# Patient Record
Sex: FEMALE | Race: WHITE
[De-identification: names, ages, dates, MRNs, and addresses within clinical notes are randomized per-mention and may not be internally consistent; named-entity substitution may affect disease eponyms.]

---

## 2018-09-30 ENCOUNTER — HOSPITAL ENCOUNTER (EMERGENCY)
Age: 63
Discharge: HOME | End: 2018-09-30
Payer: COMMERCIAL

## 2018-09-30 VITALS
OXYGEN SATURATION: 100 % | HEART RATE: 61 BPM | TEMPERATURE: 99.3 F | SYSTOLIC BLOOD PRESSURE: 170 MMHG | DIASTOLIC BLOOD PRESSURE: 94 MMHG | RESPIRATION RATE: 16 BRPM

## 2018-09-30 VITALS — HEART RATE: 67 BPM | DIASTOLIC BLOOD PRESSURE: 87 MMHG | SYSTOLIC BLOOD PRESSURE: 139 MMHG | OXYGEN SATURATION: 96 %

## 2018-09-30 VITALS — BODY MASS INDEX: 20.8 KG/M2

## 2018-09-30 DIAGNOSIS — N39.0: Primary | ICD-10-CM

## 2018-09-30 LAB
APPEARANCE UR: (no result)
BILIRUBIN URINE UA: NEGATIVE
COLOR UR: (no result)
CULTURE INDICATED URINE: (no result)
GLUCOSE URINE UA: NEGATIVE G/DL
HGB UR QL: (no result)
KETONES URINE UA: NEGATIVE
LEUKOCYTE ESTERASE URINE UA: (no result)
PH UR: 8.5 [PH] (ref 4.5–8)
PROTEIN URINE UA: (no result)
SP GR UR: 1.01 (ref 1–1.03)
UROBILINOGEN UR QL: 0.2 E.U./DL

## 2018-09-30 PROCEDURE — 87186 SC STD MICRODIL/AGAR DIL: CPT

## 2018-09-30 PROCEDURE — 99282 EMERGENCY DEPT VISIT SF MDM: CPT

## 2018-09-30 PROCEDURE — 99283 EMERGENCY DEPT VISIT LOW MDM: CPT

## 2018-09-30 PROCEDURE — 81001 URINALYSIS AUTO W/SCOPE: CPT

## 2018-09-30 PROCEDURE — 87086 URINE CULTURE/COLONY COUNT: CPT

## 2018-09-30 PROCEDURE — 87077 CULTURE AEROBIC IDENTIFY: CPT

## 2018-12-09 ENCOUNTER — HOSPITAL ENCOUNTER (OUTPATIENT)
Age: 63
End: 2018-12-09
Payer: COMMERCIAL

## 2018-12-09 DIAGNOSIS — R30.0: Primary | ICD-10-CM

## 2018-12-09 PROCEDURE — 87086 URINE CULTURE/COLONY COUNT: CPT

## 2018-12-09 PROCEDURE — 87186 SC STD MICRODIL/AGAR DIL: CPT

## 2018-12-09 PROCEDURE — 87077 CULTURE AEROBIC IDENTIFY: CPT

## 2019-04-17 ENCOUNTER — HOSPITAL ENCOUNTER (OUTPATIENT)
Age: 64
End: 2019-04-17
Payer: COMMERCIAL

## 2019-04-17 DIAGNOSIS — J02.9: Primary | ICD-10-CM

## 2019-04-17 PROCEDURE — 87070 CULTURE OTHR SPECIMN AEROBIC: CPT

## 2019-04-17 PROCEDURE — 87077 CULTURE AEROBIC IDENTIFY: CPT

## 2022-01-20 NOTE — ED.FEMALEGU
"HPI - Female Genitourinary
<VAMSHI Butler - Last Filed: 09/30/18 17:20>
General
Chief complaint: Urogenital-Female
Stated complaint: UTI
Time Seen by Provider: 09/30/18 17:20
Source: patient
Mode of arrival: ambulatory
Limitations: no limitations
Related Data
Home Medications

 Medication  Instructions  Recorded  Confirmed
VITAMIN D #0 05/10/17 10/08/18
[TUMERIC] #0 05/10/17 10/08/18
ascorbic acid (vitamin C) #0 05/10/17 10/08/18
cyanocobalamin (vitamin B-12) #0 05/10/17 10/08/18
[Vitamin B-12]   
glucosamine sulfate [Genicin] #0 05/10/17 10/08/18

Previous Rx's

 Medication  Instructions  Recorded
sulfamethoxazole-trimethoprim 1 tab PO BID #10 tab 09/30/18


Allergies

Allergy/AdvReac Type Severity Reaction Status Date / Time
No Known Drug Allergies Allergy   Verified 10/08/18 09:58



<Lyndsay Decker DO - Last Filed: 10/17/18 00:25>
General
Source: patient
Mode of arrival: ambulatory
Limitations: no limitations
History of Present Illness
HPI Narrative: The patient is a 63-year-old female who presents with painful frequent urination with all blood.  She never had a UTI before but she is going frequently.  She woke up this morning with symptoms. No fever no flank pain she does have 
some suprapubic pressure.  She denies nausea or vomiting.
MD Complaint:  UTI 

<Lyndsay Decker DO - Last Filed: 10/17/18 00:25>
Review of Systems
GENERAL: Denies chills,fever
HEENT: Denies throat pain
RESPIRATORY: Denies dyspnea, cough, wheezing
CARDIOVASCULAR: Denies chest pain, palpitations
GASTROINTESTINAL: Denies nausea, vomiting
:  See HPI
MUSCULOSKELETAL: Denies extremity pain, injury
SKIN: No rash, no laceration, no pruritus
NEUROLOGIC: Denies weakness, dizziness, headache, numbness


8 point review of systems is negative except for those stated above and HPI

Exam
<VAMSHI Butler - Last Filed: 09/30/18 17:20>
Initial Vital Signs
Initial Vital Signs:  Vital Signs

Temperature  99.3 F   09/30/18 17:10
Pulse Rate  61   09/30/18 17:10
Respiratory Rate  16   09/30/18 17:10
Blood Pressure  170/94 H  09/30/18 17:10
Pulse Oximetry  100   09/30/18 17:10



<Lyndsay Decker DO - Last Filed: 10/17/18 00:25>
Initial Vital Signs
Initial Vital Signs:  Vital Signs

Temperature  99.3 F   09/30/18 17:10
Pulse Rate  61   09/30/18 17:10
Respiratory Rate  16   09/30/18 17:10
Blood Pressure  170/94 H  09/30/18 17:10
Pulse Oximetry  100   09/30/18 17:10

GENERAL: Well-appearing, well-nourished and in no acute distress.
CARDIOVASCULAR: peripheral pulses in tact, cap refill <2 sec, regular rate and rhythm
RESPIRATORY: No respiratory distress,  speaks in full sentences without difficulty clear bilaterally
ABDOMEN: Soft, nontender, no guarding or rebound
:  No CVA tenderness
EXTREMITIES: Normal range of motion, no clubbing or edema.  Neurovascularly intact
NEUROLOGICAL: Cranial nerves II through XII grossly intact.  Normal gait and speech.
SKIN: Warm, dry, no petechiae, no rashes or lesions.

Course
<VAMSHI Butler - Last Filed: 09/30/18 17:20>
Orders
Ordered:  


Discontinued Medications

Phenazopyridine HCl (Pyridium 100mg Prepack)  1 bottle MISC SEEINSTR ONE
 Stop: 09/30/18 17:48
 Last Admin: 09/30/18 18:01  Dose: 1 bottle
Trimethoprim/Sulfamethoxazole (Bactrim Ds Prepack)  1 bottle MISC SEEINSTR ONE
 Stop: 09/30/18 17:48
 Last Admin: 09/30/18 18:01  Dose: 1 bottle


Vital Signs - 8 hr

 09/30/18
17:10
Temperature 99.3 F
Pulse Rate 61
Respiratory Rate 16
Blood Pressure 170/94 H
Pulse Oximetry 100



<Lyndsay Decker DO - Last Filed: 10/17/18 00:25>
Orders
Ordered:  


Discontinued Medications

Phenazopyridine HCl (Pyridium 100mg Prepack)  1 bottle MISC SEEINSTR ONE
 Stop: 09/30/18 17:48
 Last Admin: 09/30/18 18:01  Dose: 1 bottle
Trimethoprim/Sulfamethoxazole (Bactrim Ds Prepack)  1 bottle MISC SEEINSTR ONE
 Stop: 09/30/18 17:48
 Last Admin: 09/30/18 18:01  Dose: 1 bottle


Vital Signs - 8 hr

 09/30/18

[id number]|2018-09-30 17:20:00|2018-09-30 17:20:00|[facility]|Emergency Department|[id number]|"HPI - Female Genitourinary
Counseling Text: I recommended taking nicotinamide or niacinamide, also know as vitamin B3, twice daily. Recent evidence suggests that taking vitamin B3 (500 mg twice daily) can reduce the risk of actinic keratoses and non-melanoma skin cancers. Side effects of vitamin B3 include flushing and headache.
Detail Level: Simple

## 2023-01-18 ENCOUNTER — APPOINTMENT (RX ONLY)
Dept: URBAN - METROPOLITAN AREA CLINIC 50 | Facility: CLINIC | Age: 68
Setting detail: DERMATOLOGY
End: 2023-01-18

## 2023-01-18 DIAGNOSIS — Z41.9 ENCOUNTER FOR PROCEDURE FOR PURPOSES OTHER THAN REMEDYING HEALTH STATE, UNSPECIFIED: ICD-10-CM

## 2023-01-18 PROCEDURE — ? RESTYLANE KYSSE INJECTION

## 2023-01-18 PROCEDURE — ? DYSPORT

## 2023-01-18 ASSESSMENT — LOCATION DETAILED DESCRIPTION DERM
LOCATION DETAILED: LEFT MEDIAL EYEBROW
LOCATION DETAILED: LEFT SUPERIOR VERMILION LIP
LOCATION DETAILED: RIGHT INFERIOR VERMILION LIP
LOCATION DETAILED: LEFT INFERIOR VERMILION LIP
LOCATION DETAILED: RIGHT CENTRAL EYEBROW
LOCATION DETAILED: GLABELLA
LOCATION DETAILED: RIGHT SUPERIOR VERMILION LIP

## 2023-01-18 ASSESSMENT — LOCATION SIMPLE DESCRIPTION DERM
LOCATION SIMPLE: LEFT LIP
LOCATION SIMPLE: GLABELLA
LOCATION SIMPLE: LEFT EYEBROW
LOCATION SIMPLE: RIGHT LIP
LOCATION SIMPLE: RIGHT EYEBROW

## 2023-01-18 ASSESSMENT — LOCATION ZONE DERM
LOCATION ZONE: LIP
LOCATION ZONE: FACE

## 2023-01-18 NOTE — PROCEDURE: RESTYLANE KYSSE INJECTION
Procedural Text: The filler was administered to the treatment areas noted above\\n\\nConsent signed. All questions answered.

## 2023-01-18 NOTE — PROCEDURE: DYSPORT
Consent: All questions answered.\\n\\[id number]; 48 Dysport units Consent: All questions answered.\\n\\[id number]; 48 Dysport units

## 2023-01-18 NOTE — PROCEDURE: RESTYLANE KYSSE INJECTION
Price (Use Numbers Only, No Special Characters Or $): 728 Price (Use Numbers Only, No Special Characters Or $): 671

## 2023-01-18 NOTE — PROCEDURE: RESTYLANE KYSSE INJECTION
Consent: Written consent obtained. Risks include but not limited to bruising, bleeding, irregular texture, ulceration, infection, allergic reaction, scar formation, incomplete augmentation, temporary nature, procedural pain. Discussed with patient that a risk with any filler is filler into the vessel leading to ischemia, necrosis and ulceration and blindness

## 2023-01-18 NOTE — PROCEDURE: RESTYLANE KYSSE INJECTION
Post-Care Instructions: No strenuous activity for 24 hours. Patient may massage areas of lumps or bumps unless instructed otherwise by provider. \\nIce once more prior to bedtime 5-10 minutes. Ice compression pack provided\\nSleep elevated on 1-2 extra pillows for the next 2-3 nights to help encourage swelling to go down.